# Patient Record
Sex: MALE | Race: ASIAN | NOT HISPANIC OR LATINO
[De-identification: names, ages, dates, MRNs, and addresses within clinical notes are randomized per-mention and may not be internally consistent; named-entity substitution may affect disease eponyms.]

---

## 2022-04-04 PROBLEM — Z00.00 ENCOUNTER FOR PREVENTIVE HEALTH EXAMINATION: Status: ACTIVE | Noted: 2022-04-04

## 2022-04-06 ENCOUNTER — APPOINTMENT (OUTPATIENT)
Dept: THORACIC SURGERY | Facility: CLINIC | Age: 72
End: 2022-04-06
Payer: COMMERCIAL

## 2022-04-06 VITALS — HEIGHT: 65 IN | WEIGHT: 176 LBS | BODY MASS INDEX: 29.32 KG/M2

## 2022-04-06 PROCEDURE — G0296 VISIT TO DETERM LDCT ELIG: CPT

## 2022-04-06 NOTE — PLAN
[Smoking Cessation] : smoking cessation [FreeTextEntry1] : Plan:\par \par -Low dose CT chest for lung cancer screening. Dr. Adán Correia ordered the low dose CT.      \par \par -Follow up with patient and his referring provider after his LDCT results have been reviewed by the multidisciplinary clinical team, if needed.\par \par -Encourage continued smoking abstinence.\par \par -Patient declines referral to CTC and CCX\par \par Should I screen? tool utilized. 6 Year risk of lung cancer is   4%. \par \par Engaged in discussion regarding risks of screening during Covid-19 pandemic and precautions that are being used  to reduce exposure.\par \par Engaged in shared decision making with Mr. LOMAS . Answered all questions. He verbalized understanding and agreement. He knows to call back with and questions or concerns.\par

## 2022-04-06 NOTE — ASSESSMENT
[Maintenance] : Maintenance: The patient has quit for more than 6 months [de-identified] : Daughter and patient report confidence in maintaining smoking abstinence.

## 2022-04-06 NOTE — HISTORY OF PRESENT ILLNESS
[Former] : former smoker [_____ pack-years] : [unfilled] pack-years [TextBox_13] : Referred by Dr. Adán Correia\par \par TONA LOMAS   had telephonic visit for a review of eligibility and discussion of the Low dose CT lung cancer screening program. A telephonic visit occurred due to the patient not having access to a smart phone or a computer for an audio/visual visit.  The following was reviewed and confirmed the patient meets screening eligibility criteria.\par -Age 71 year\par Smoking Status:\par -Former smoker\par -Number of pack(s) per day: 1- 2 PPD\par -Number of years smoked: 49\par -Number of pack years smokin.5\par -Number of years since quitting smokin\par -Quit year: \par \par Mr. LOMAS   denies any signs or symptoms of lung cancer including new cough, changing cough, hemoptysis, and unintentional weight loss. \par \par Mr. LOMAS daughter reports he had "fluid in his lungs" 5 years ago and had chest tube placed. Has had hemoptysis in the past, last episode over 2 years ago. History of COPD. He  denies any personal history of lung cancer. Reports no lung cancer in a 1st degree relative. Reports no lung cancer in a 2nd degree relative. . Denies any  history of  occupational exposures. Has no exposure to 2nd hand smoke.\par  [TextBox_10] : 2017

## 2022-04-06 NOTE — REASON FOR VISIT
[Home] : at home, [unfilled] , at the time of the visit. [Other Location: e.g. Home (Enter Location, City,State)___] : at [unfilled] [Family Member] : family member [] :  [Verbal consent obtained from patient] : the patient, [unfilled] [Interpreters_IDNumber] : [id number] [Initial Evaluation] : an initial evaluation visit [Review of Eligibility] : review of eligibility [Low-Dose CT Screening Discussion] : low-dose CT lung cancer screening discussion

## 2022-04-20 ENCOUNTER — NON-APPOINTMENT (OUTPATIENT)
Age: 72
End: 2022-04-20

## 2022-11-08 ENCOUNTER — APPOINTMENT (OUTPATIENT)
Dept: GASTROENTEROLOGY | Facility: CLINIC | Age: 72
End: 2022-11-08

## 2022-11-08 VITALS
OXYGEN SATURATION: 97 % | DIASTOLIC BLOOD PRESSURE: 68 MMHG | BODY MASS INDEX: 31.82 KG/M2 | HEART RATE: 96 BPM | HEIGHT: 65 IN | SYSTOLIC BLOOD PRESSURE: 118 MMHG | TEMPERATURE: 98.6 F | WEIGHT: 191 LBS

## 2022-11-08 PROCEDURE — 99203 OFFICE O/P NEW LOW 30 MIN: CPT

## 2022-11-08 NOTE — PHYSICAL EXAM
[Normal] : alert, normal voice/communication, healthy appearing, no acute distress [No Respiratory Distress] : no respiratory distress [Abdomen Tenderness] : non-tender [Abdomen Soft] : soft [Oriented To Time, Place, And Person] : oriented to person, place, and time

## 2022-11-08 NOTE — ASSESSMENT
[FreeTextEntry1] : Schedule colonoscopy with Dr. Hartmann\par - Patient becomes short of breath walking short distances and walking up stairs. Daughter corroborates. Will need clearance from cardiologist/pulmonologist prior to procedure. Referrals provided and will schedule after.\par - Explained the risks (including but not limited to cardiopulmonary anesthetic complications, bleeding, perforation, aspiration, missed lesions and misidentified sites of lesions - complications that might necessitate hospitalization or surgery), benefits and alternatives of colonoscopy were reviewed with the patient. Preparation for the procedure was reviewed with the patient. The patient was informed that she/he would be given intravenous anesthesia by an anesthesiologist for the procedure. The patient was informed that a family member or friend must drive the patient following recovery from the procedure. Patient understands and agrees, all questions answered.\par \par

## 2022-11-08 NOTE — HISTORY OF PRESENT ILLNESS
[FreeTextEntry1] : Daughter is translating from [employer].\par \par Mr. TONA LOMAS is a 72 year old male with a PMH of HTN, COPD (not on home O2), CVA (20+ years ago).\par No PSH. \par \par Referred to Dr. Hartmann by Dr. LAKISHA GARZON for positive FIT test (1 month ago). \par \par No prior colonoscopy. \par Denies change in bowel habits. \par Denies constipation, N/V/D, rectal bleeding, abdominal pain, bloating, unintentional weight loss, early satiety, heartburn or reflux.  \par Denies bloody or black stools. \par BM every 1-2 days; soft, brown stools.  \par Denies family history of colon cancer or advanced colorectal polyps. \par Smoking status: Former smoker, quit 6 years ago, smoked 1-2 PPD x 40 years.\par Alcohol intake: Former alcohol abuse, quit 6 years ago. \par Illicit drug use: None. \par \par \par

## 2022-11-14 ENCOUNTER — APPOINTMENT (OUTPATIENT)
Dept: CARDIOLOGY | Facility: CLINIC | Age: 72
End: 2022-11-14

## 2022-11-14 ENCOUNTER — NON-APPOINTMENT (OUTPATIENT)
Age: 72
End: 2022-11-14

## 2022-11-14 VITALS
WEIGHT: 187 LBS | HEIGHT: 65 IN | BODY MASS INDEX: 31.16 KG/M2 | HEART RATE: 99 BPM | OXYGEN SATURATION: 97 % | SYSTOLIC BLOOD PRESSURE: 98 MMHG | DIASTOLIC BLOOD PRESSURE: 61 MMHG

## 2022-11-14 DIAGNOSIS — Z87.891 PERSONAL HISTORY OF NICOTINE DEPENDENCE: ICD-10-CM

## 2022-11-14 DIAGNOSIS — Z82.3 FAMILY HISTORY OF STROKE: ICD-10-CM

## 2022-11-14 DIAGNOSIS — Z86.73 PERSONAL HISTORY OF TRANSIENT ISCHEMIC ATTACK (TIA), AND CEREBRAL INFARCTION W/OUT RESIDUAL DEFICITS: ICD-10-CM

## 2022-11-14 DIAGNOSIS — Z86.11 PERSONAL HISTORY OF TUBERCULOSIS: ICD-10-CM

## 2022-11-14 PROCEDURE — 99204 OFFICE O/P NEW MOD 45 MIN: CPT

## 2022-11-14 PROCEDURE — 93000 ELECTROCARDIOGRAM COMPLETE: CPT

## 2022-11-14 RX ORDER — UMECLIDINIUM 62.5 UG/1
62.5 AEROSOL, POWDER ORAL
Qty: 30 | Refills: 0 | Status: ACTIVE | COMMUNITY
Start: 2022-10-25

## 2022-11-14 RX ORDER — TAMSULOSIN HYDROCHLORIDE 0.4 MG/1
0.4 CAPSULE ORAL
Qty: 30 | Refills: 0 | Status: ACTIVE | COMMUNITY
Start: 2022-10-25

## 2022-11-14 RX ORDER — ALBUTEROL SULFATE 90 UG/1
108 (90 BASE) INHALANT RESPIRATORY (INHALATION)
Qty: 8 | Refills: 0 | Status: ACTIVE | COMMUNITY
Start: 2022-10-17

## 2022-11-14 NOTE — HISTORY OF PRESENT ILLNESS
[FreeTextEntry1] : 71 yo male with COPD, CAD (reported mod-severe coronary calcification of LM and proximal LAD per 4/15/22 screening CT for lung CA on 415/22), who presents today for cardiac evaluation of exertional dyspnea and pre-operative cardiac evaluation for pending colonoscopy (due to reported positive FIT test). He has not have any prior cardiac evaluation. \par \par Primary: [facility] (fax: [phone])

## 2022-11-14 NOTE — ASSESSMENT
[FreeTextEntry1] : 73 yo male with COPD, CAD (reported mod-severe coronary calcification of LM and proximal LAD per 4/15/22 screening CT for lung CA on 415/22), and exertional dyspnea, who is currently pending colonoscopy (due to reported positive FIT test). \par ECG today demonstrated sinus rhythm.\par \par Given exertional dyspnea and reported CAD (per 4/15/22 CT scan), patient will need ischemic evaluation.\par WIll perform treadmill nuclear stress test for ischemic evaluation/risk stratification.\par Will perform echocardiogram to assess LV function and structural heart disease.\par After review of test results, will determine if further cardiac work-up or intervention is clinically indicated and make final pre-op cardiac evaluation.\par Will continue aspirin 81 mg po daily and rosuvasatin 5 mg po daily at this time.\par \par BP is currently controlled.\par Will continue losartan 100 mg po daily and HCTZ 25 mg po daily.

## 2022-11-14 NOTE — REVIEW OF SYSTEMS
[SOB] : shortness of breath [Dyspnea on exertion] : dyspnea during exertion [Wheezing] : wheezing [Negative] : Heme/Lymph [Chest Discomfort] : no chest discomfort [Lower Ext Edema] : no extremity edema [Leg Claudication] : no intermittent leg claudication [Palpitations] : no palpitations [Orthopnea] : no orthopnea [Syncope] : no syncope

## 2022-11-14 NOTE — PHYSICAL EXAM
[Well Developed] : well developed [Well Nourished] : well nourished [No Acute Distress] : no acute distress [Normal Conjunctiva] : normal conjunctiva [Normal Venous Pressure] : normal venous pressure [No Carotid Bruit] : no carotid bruit [Normal S1, S2] : normal S1, S2 [No Murmur] : no murmur [No Rub] : no rub [No Gallop] : no gallop [Wheezing Bilaterally] : wheezing was heard diffusely over both lungs [Normal Gait] : normal gait [No Edema] : no edema [No Cyanosis] : no cyanosis [No Clubbing] : no clubbing [Moves all extremities] : moves all extremities [No Focal Deficits] : no focal deficits [Normal Speech] : normal speech [Alert and Oriented] : alert and oriented [Normal memory] : normal memory

## 2022-11-14 NOTE — CARDIOLOGY SUMMARY
[de-identified] : \par 11/14/22 ECG: Sinus rhythm, rate 91 bpm\par  [de-identified] : \par 4/15/22 CT lung cancer screening: \par Extensive emphysema\par Coronary artery calcifications (mod-severe LM and LAD)\par Mildly dilated ascending thoracic aorta

## 2022-11-15 ENCOUNTER — APPOINTMENT (OUTPATIENT)
Dept: PULMONOLOGY | Facility: CLINIC | Age: 72
End: 2022-11-15

## 2022-11-15 VITALS
HEIGHT: 65 IN | WEIGHT: 187 LBS | TEMPERATURE: 98.6 F | OXYGEN SATURATION: 98 % | HEART RATE: 90 BPM | BODY MASS INDEX: 31.16 KG/M2

## 2022-11-15 VITALS — DIASTOLIC BLOOD PRESSURE: 60 MMHG | SYSTOLIC BLOOD PRESSURE: 100 MMHG

## 2022-11-15 PROCEDURE — 99205 OFFICE O/P NEW HI 60 MIN: CPT

## 2022-11-15 RX ORDER — FLUTICASONE FUROATE, UMECLIDINIUM BROMIDE AND VILANTEROL TRIFENATATE 100; 62.5; 25 UG/1; UG/1; UG/1
100-62.5-25 POWDER RESPIRATORY (INHALATION) DAILY
Qty: 1 | Refills: 3 | Status: ACTIVE | COMMUNITY
Start: 2022-11-15 | End: 1900-01-01

## 2022-11-15 NOTE — HISTORY OF PRESENT ILLNESS
[TextBox_4] : 72 year old male came in for initial evaluation\par Had a LDCT chest seen, my read: b/l upper lobes fibrosis with bronchiectasis. 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 He was diagnosed with COPD 5 years ago. He is using Albuterol and Incruse.\par He is able to walk 1/4 block.\par he coughs every day. Has throat clearing.\par Last hospitalization 5 years ago.\par Last PFT's 5 years \par Had hemoptysis 5 years ago, nothing since then.\par Snores at night. Takes naps every day.\par No observed pauses.\par \par Quit smoking 6 years ago. Has 73 pack year.\par FHX: denies lung problems.\par \par \par \par

## 2022-11-15 NOTE — PHYSICAL EXAM
[Neck Circumference: ___] : neck circumference: [unfilled] [No Acute Distress] : no acute distress [III] : Mallampati Class: III [Normal Rate/Rhythm] : normal rate/rhythm [Normal S1, S2] : normal s1, s2 [No Resp Distress] : no resp distress [Rhonchi] : rhonchi [No Abnormalities] : no abnormalities [Normal Gait] : normal gait [No Cyanosis] : no cyanosis [No Edema] : no edema [No Focal Deficits] : no focal deficits [Oriented x3] : oriented x3 [Normal Affect] : normal affect [TextBox_105] : clubbing

## 2022-11-15 NOTE — REVIEW OF SYSTEMS
[Postnasal Drip] : postnasal drip [Cough] : cough [SOB on Exertion] : sob on exertion [Negative] : Hematologic

## 2022-12-12 ENCOUNTER — APPOINTMENT (OUTPATIENT)
Dept: CARDIOLOGY | Facility: CLINIC | Age: 72
End: 2022-12-12

## 2022-12-12 PROCEDURE — 93306 TTE W/DOPPLER COMPLETE: CPT

## 2022-12-15 ENCOUNTER — NON-APPOINTMENT (OUTPATIENT)
Age: 72
End: 2022-12-15

## 2023-01-17 ENCOUNTER — APPOINTMENT (OUTPATIENT)
Dept: PULMONOLOGY | Facility: CLINIC | Age: 73
End: 2023-01-17

## 2023-03-17 ENCOUNTER — RESULT REVIEW (OUTPATIENT)
Age: 73
End: 2023-03-17

## 2023-03-17 ENCOUNTER — LABORATORY RESULT (OUTPATIENT)
Age: 73
End: 2023-03-17

## 2023-03-17 ENCOUNTER — APPOINTMENT (OUTPATIENT)
Dept: PULMONOLOGY | Facility: CLINIC | Age: 73
End: 2023-03-17
Payer: COMMERCIAL

## 2023-03-17 VITALS
DIASTOLIC BLOOD PRESSURE: 88 MMHG | TEMPERATURE: 97.6 F | BODY MASS INDEX: 30.49 KG/M2 | HEIGHT: 65 IN | SYSTOLIC BLOOD PRESSURE: 132 MMHG | WEIGHT: 183 LBS | OXYGEN SATURATION: 98 % | HEART RATE: 91 BPM

## 2023-03-17 DIAGNOSIS — Z01.811 ENCOUNTER FOR PREPROCEDURAL RESPIRATORY EXAMINATION: ICD-10-CM

## 2023-03-17 DIAGNOSIS — J43.9 EMPHYSEMA, UNSPECIFIED: ICD-10-CM

## 2023-03-17 DIAGNOSIS — J47.9 BRONCHIECTASIS, UNCOMPLICATED: ICD-10-CM

## 2023-03-17 DIAGNOSIS — G47.33 OBSTRUCTIVE SLEEP APNEA (ADULT) (PEDIATRIC): ICD-10-CM

## 2023-03-17 PROCEDURE — 99215 OFFICE O/P EST HI 40 MIN: CPT

## 2023-03-17 RX ORDER — ALBUTEROL SULFATE 2.5 MG/3ML
(2.5 MG/3ML) SOLUTION RESPIRATORY (INHALATION)
Qty: 1 | Refills: 5 | Status: ACTIVE | COMMUNITY
Start: 2023-03-17 | End: 1900-01-01

## 2023-03-17 NOTE — HISTORY OF PRESENT ILLNESS
[TextBox_4] : 72 year old male with COPD, bronchiectasis, DOV came for f/u.\par Has the same dyspnea on exertion.\par He is on Trelegy with no clear improvement. Does not use Albuterol very often.\par No exacerbations since the last visit.\par Has some cough but does not bring up any sputum\par No hemoptysis.\par DEnies fever, chills, wheezing.\par Did not go for PFT's or pulmonary rehab.\par At home he sleeps better that he slept in the sleep center. Sleep schedule the same\par Scheduled for thromboendarterectomy.\par \par \par

## 2023-03-17 NOTE — REASON FOR VISIT
[Follow-Up] : a follow-up visit [Sleep Apnea] : sleep apnea [Bronchiectasis] : bronchiectasis [COPD] : COPD

## 2023-03-17 NOTE — PHYSICAL EXAM
[No Acute Distress] : no acute distress [Normal Appearance] : normal appearance [Normal Rate/Rhythm] : normal rate/rhythm [Normal S1, S2] : normal s1, s2 [No Resp Distress] : no resp distress [No Acc Muscle Use] : no acc muscle use [Rhonchi] : rhonchi [No Abnormalities] : no abnormalities [No Focal Deficits] : no focal deficits [Oriented x3] : oriented x3 [Normal Affect] : normal affect [TextBox_105] : clubbing

## 2023-03-20 ENCOUNTER — RESULT REVIEW (OUTPATIENT)
Age: 73
End: 2023-03-20

## 2023-03-23 ENCOUNTER — APPOINTMENT (OUTPATIENT)
Dept: CARDIOLOGY | Facility: CLINIC | Age: 73
End: 2023-03-23
Payer: COMMERCIAL

## 2023-03-23 VITALS
BODY MASS INDEX: 30.49 KG/M2 | HEIGHT: 65 IN | OXYGEN SATURATION: 97 % | DIASTOLIC BLOOD PRESSURE: 68 MMHG | SYSTOLIC BLOOD PRESSURE: 111 MMHG | WEIGHT: 183 LBS | HEART RATE: 100 BPM

## 2023-03-23 LAB — BACTERIA SPT CULT: ABNORMAL

## 2023-03-23 PROCEDURE — 99215 OFFICE O/P EST HI 40 MIN: CPT

## 2023-03-23 RX ORDER — ASPIRIN 325 MG/1
325 TABLET, COATED ORAL DAILY
Refills: 0 | Status: ACTIVE | COMMUNITY

## 2023-03-23 NOTE — PHYSICAL EXAM
[Well Developed] : well developed [Well Nourished] : well nourished [No Acute Distress] : no acute distress [Normal Conjunctiva] : normal conjunctiva [Normal Venous Pressure] : normal venous pressure [No Carotid Bruit] : no carotid bruit [Normal S1, S2] : normal S1, S2 [No Murmur] : no murmur [No Rub] : no rub [No Gallop] : no gallop [Wheezing Bilaterally] : wheezing was heard diffusely over both lungs [No Edema] : no edema [No Cyanosis] : no cyanosis [No Clubbing] : no clubbing [Alert and Oriented] : alert and oriented [Normal memory] : normal memory [Moves all extremities] : moves all extremities [No Focal Deficits] : no focal deficits

## 2023-03-24 LAB
ALBUMIN SERPL ELPH-MCNC: 4.7 G/DL
ALP BLD-CCNC: 78 U/L
ALT SERPL-CCNC: 21 U/L
ANION GAP SERPL CALC-SCNC: 13 MMOL/L
APTT BLD: 32.9 SEC
AST SERPL-CCNC: 21 U/L
BASOPHILS # BLD AUTO: 0.12 K/UL
BASOPHILS NFR BLD AUTO: 1.9 %
BILIRUB SERPL-MCNC: 0.2 MG/DL
BUN SERPL-MCNC: 26 MG/DL
CALCIUM SERPL-MCNC: 9.9 MG/DL
CHLORIDE SERPL-SCNC: 105 MMOL/L
CHOLEST SERPL-MCNC: 173 MG/DL
CO2 SERPL-SCNC: 24 MMOL/L
CREAT SERPL-MCNC: 1.27 MG/DL
EGFR: 61 ML/MIN/1.73M2
EOSINOPHIL # BLD AUTO: 0.48 K/UL
EOSINOPHIL NFR BLD AUTO: 7.5 %
GLUCOSE SERPL-MCNC: 128 MG/DL
HCT VFR BLD CALC: 44.7 %
HDLC SERPL-MCNC: 45 MG/DL
HGB BLD-MCNC: 13.7 G/DL
IMM GRANULOCYTES NFR BLD AUTO: 0.2 %
INR PPP: 0.97 RATIO
LDLC SERPL CALC-MCNC: 85 MG/DL
LYMPHOCYTES # BLD AUTO: 1.55 K/UL
LYMPHOCYTES NFR BLD AUTO: 24.2 %
MAN DIFF?: NORMAL
MCHC RBC-ENTMCNC: 28.6 PG
MCHC RBC-ENTMCNC: 30.6 GM/DL
MCV RBC AUTO: 93.3 FL
MONOCYTES # BLD AUTO: 0.67 K/UL
MONOCYTES NFR BLD AUTO: 10.5 %
NEUTROPHILS # BLD AUTO: 3.57 K/UL
NEUTROPHILS NFR BLD AUTO: 55.7 %
NONHDLC SERPL-MCNC: 128 MG/DL
PLATELET # BLD AUTO: 293 K/UL
POTASSIUM SERPL-SCNC: 4 MMOL/L
PROT SERPL-MCNC: 7.8 G/DL
PT BLD: 11.3 SEC
RBC # BLD: 4.79 M/UL
RBC # FLD: 14.5 %
SODIUM SERPL-SCNC: 142 MMOL/L
TRIGL SERPL-MCNC: 218 MG/DL
WBC # FLD AUTO: 6.4 K/UL

## 2023-03-30 RX ORDER — CLOPIDOGREL BISULFATE 75 MG/1
75 TABLET, FILM COATED ORAL DAILY
Refills: 0 | Status: ACTIVE | COMMUNITY

## 2023-03-30 NOTE — HISTORY OF PRESENT ILLNESS
[FreeTextEntry1] : 73 yo male with COPD, recent CVA 1/23/23 at [facility] -> tPA and right carotid artery stenting and pending left carotid intervention, CAD (reported mod-severe coronary calcification of LM and proximal LAD per 4/15/22 screening CT for lung CA on 415/22), and exertional dyspnea, who was initially seen in Nov 2022 for evaluation of exertional dyspnea. He underwent treadmill nuclear stress test at [place] on 3/20/23, which reported medium sized infarct in the apical, apical lateral, apical inferior, mid inferior, and mid inferolateral regions with mild apical hypokinesis, LVEF 56%. Patient continues to report exertional dyspnea, which was attributed to his COPD. Patient denies chest pain, palpitations, syncope, edema, melena, hematochezia, or hematemesis. Patient's daughter reports that his HCTZ and losartan were discontinued during [facility] hospitalization due to low BP. \par \par Primary: [facility] (fax: [phone])

## 2023-03-30 NOTE — CARDIOLOGY SUMMARY
[de-identified] : \par 11/14/22 ECG: Sinus rhythm, rate 91 bpm\par  [de-identified] : \par 3/20/23 Treadmill Myoview (at [facility]): No CP or ECG changes at 87% max pred HR, 3.9 METs. Medium sized moderate infarct of apical, apical lateral, apical inferior, mid inferior, and mid inferolateral regions. Mild apical hypokinesis, LVEF 56%.\par  [de-identified] : \par 4/15/22 CT lung cancer screening: \par Extensive emphysema\par Coronary artery calcifications (mod-severe LM and LAD)\par Mildly dilated ascending thoracic aorta

## 2023-03-30 NOTE — ASSESSMENT
[FreeTextEntry1] : 73 yo male with COPD, recent CVA 1/23/23 at [facility] -> tPA and right carotid artery stenting and pending left carotid intervention, CAD (reported mod-severe coronary calcification of LM and proximal LAD per 4/15/22 screening CT for lung CA on 415/22), and exertional dyspnea with abnormal nuclear stress test on 3/20/23.\par \par Given exertional dyspnea, CAD (per 4/15/22 CT scan), and abnormal nuclear stress test on 3/20/23, will refer patient for left cardiac cath at [facility].\par Will continue aspirin 325 mg po daily and rosuvastatin 5 mg po daily at this time.\par Will check labs for cath (CBC, CMP, lipid panel, PT, PTT).\par Pending results of lipid panel, will adjust rosuvastatin dose given known CAD and recent CVA.\par \par BP is currently normotensive.\par Will continue to monitor off his medications (losartan 100 mg po daily and HCTZ 25 mg po daily), which were discontinued during [facility] hospitalization due to low BP.

## 2023-04-07 ENCOUNTER — NON-APPOINTMENT (OUTPATIENT)
Age: 73
End: 2023-04-07

## 2023-04-09 ENCOUNTER — TRANSCRIPTION ENCOUNTER (OUTPATIENT)
Age: 73
End: 2023-04-09

## 2023-04-26 ENCOUNTER — NON-APPOINTMENT (OUTPATIENT)
Age: 73
End: 2023-04-26

## 2023-04-26 ENCOUNTER — APPOINTMENT (OUTPATIENT)
Dept: CARDIOLOGY | Facility: CLINIC | Age: 73
End: 2023-04-26
Payer: COMMERCIAL

## 2023-04-26 VITALS
DIASTOLIC BLOOD PRESSURE: 70 MMHG | WEIGHT: 178 LBS | TEMPERATURE: 98 F | OXYGEN SATURATION: 97 % | HEART RATE: 66 BPM | SYSTOLIC BLOOD PRESSURE: 95 MMHG | RESPIRATION RATE: 16 BRPM | BODY MASS INDEX: 29.62 KG/M2

## 2023-04-26 DIAGNOSIS — Z01.810 ENCOUNTER FOR PREPROCEDURAL CARDIOVASCULAR EXAMINATION: ICD-10-CM

## 2023-04-26 DIAGNOSIS — R94.39 ABNORMAL RESULT OF OTHER CARDIOVASCULAR FUNCTION STUDY: ICD-10-CM

## 2023-04-26 PROCEDURE — 99214 OFFICE O/P EST MOD 30 MIN: CPT

## 2023-04-26 PROCEDURE — 93000 ELECTROCARDIOGRAM COMPLETE: CPT | Mod: NC

## 2023-04-26 NOTE — ASSESSMENT
[FreeTextEntry1] : 73 yo male with COPD, recent CVA 1/23/23 at [facility] -> tPA and right carotid artery stenting and pending left carotid intervention, and CAD s/p PCI of LAD with Onxy [place] stent on 4/7/23 at [facility]. Patient is currently pending carotid artery stenting at [facility] with Dr. Carlo Sales (fax: [phone]) on 5/2/23.\par \par Patient does not have any cardiac contraindications to carotid artery stenting. \par Pre-op labs to be drawn at [facility] tomorrow.\par If labs are within normal limits (to be determined by ordering physician at [facility]), patient may proceed with acceptable cardiac risk and without further cardiac work-up or intervention.\par \par Patient is clinically stable from CAD standpoint.\par Will continue aspirin 325 mg po daily, clopidogrel 75 mg po daily, and rosuvastatin 5 mg po daily at this time.\par Will repeat labs at follow-up in 3 months and adjust rosuvastatin dose as tolerated. Patient reportedly did not tolerate rosuvastatin 40 mg po daily in past.

## 2023-04-26 NOTE — PHYSICAL EXAM
Mom notified of Dr. Pete's advice. Mom stated understanding and denied questions   [Well Developed] : well developed [Well Nourished] : well nourished [No Acute Distress] : no acute distress [Normal Conjunctiva] : normal conjunctiva [Normal Venous Pressure] : normal venous pressure [No Carotid Bruit] : no carotid bruit [Normal S1, S2] : normal S1, S2 [No Murmur] : no murmur [No Rub] : no rub [No Gallop] : no gallop [Wheezing Bilaterally] : wheezing was heard diffusely over both lungs [No Cyanosis] : no cyanosis [No Clubbing] : no clubbing [Moves all extremities] : moves all extremities [No Focal Deficits] : no focal deficits [Alert and Oriented] : alert and oriented [Normal memory] : normal memory [No Pitting Edema] : no pitting edema present [2+] : right 2+

## 2023-04-26 NOTE — CARDIOLOGY SUMMARY
[de-identified] : \par 4/26/23 ECG: Sinus rhythm, rate 83 bpm, non-specific T wave abnormalities\par 11/14/22 ECG: Sinus rhythm, rate 91 bpm\par  [de-identified] : \par 3/20/23 Treadmill Myoview (at [facility]): No CP or ECG changes at 87% max pred HR, 3.9 METs. Medium sized moderate infarct of apical, apical lateral, apical inferior, mid inferior, and mid inferolateral regions. Mild apical hypokinesis, LVEF 56%.\par  [de-identified] : \par 12/12/22 Echo: Normal LV size and systolic/diastolic function, LVEF 58%. Normal RV size and systolic function. Trace MR. Dilated aortic root (4.2 cm) and proximal ascending aorta (3.8 cm).\par  [de-identified] : \par 4/7/23 Cardiac cath (at [facility]): \par 70% mid LAD (iFR 0.74) -> PCI with [place] stent\par Mild atherosclerosis of LCx, OM1, OM2, and RCA\par LVEDP 12 mmHg [de-identified] : \par 4/15/22 CT lung cancer screening: \par Extensive emphysema\par Coronary artery calcifications (mod-severe LM and LAD)\par Mildly dilated ascending thoracic aorta\par

## 2023-04-26 NOTE — HISTORY OF PRESENT ILLNESS
[Preoperative Visit] : for a medical evaluation prior to surgery [Scheduled Procedure ___] : a [unfilled] [Date of Surgery ___] : on [unfilled] [Surgeon Name ___] : surgeon: [unfilled] [Stable] : Stable [FreeTextEntry1] : \par 71 yo male with COPD, recent CVA 1/23/23 at [facility] -> tPA and right carotid artery stenting and pending left carotid intervention, and CAD s/p PCI of LAD with Onxy [place] stent on 4/7/23 at [facility]. Patient presents today for pre-operative cardiac evaluation for pending carotid artery stenting at [facility] with Dr. Carlo Sales (fax: [phone]) on 5/2/23. Patient denies chest pain, dyspnea, palpitations, syncope, edema, melena, hematochezia, or hematemesis. He reports that he will have labs drawn at [facility] tomorrow. \par \par Primary: [facility] (fax: [phone])

## 2023-05-07 LAB — ACID FAST STN SPT: NORMAL

## 2023-06-19 ENCOUNTER — APPOINTMENT (OUTPATIENT)
Dept: PULMONOLOGY | Facility: CLINIC | Age: 73
End: 2023-06-19

## 2023-07-26 ENCOUNTER — NON-APPOINTMENT (OUTPATIENT)
Age: 73
End: 2023-07-26

## 2023-07-26 ENCOUNTER — APPOINTMENT (OUTPATIENT)
Dept: CARDIOLOGY | Facility: CLINIC | Age: 73
End: 2023-07-26
Payer: COMMERCIAL

## 2023-07-26 VITALS
RESPIRATION RATE: 14 BRPM | BODY MASS INDEX: 52.48 KG/M2 | WEIGHT: 315 LBS | SYSTOLIC BLOOD PRESSURE: 134 MMHG | HEIGHT: 65 IN | OXYGEN SATURATION: 97 % | DIASTOLIC BLOOD PRESSURE: 70 MMHG | HEART RATE: 83 BPM

## 2023-07-26 DIAGNOSIS — R06.02 SHORTNESS OF BREATH: ICD-10-CM

## 2023-07-26 DIAGNOSIS — I65.23 OCCLUSION AND STENOSIS OF BILATERAL CAROTID ARTERIES: ICD-10-CM

## 2023-07-26 DIAGNOSIS — I25.118 ATHEROSCLEROTIC HEART DISEASE OF NATIVE CORONARY ARTERY WITH OTHER FORMS OF ANGINA PECTORIS: ICD-10-CM

## 2023-07-26 DIAGNOSIS — E78.5 HYPERLIPIDEMIA, UNSPECIFIED: ICD-10-CM

## 2023-07-26 DIAGNOSIS — I10 ESSENTIAL (PRIMARY) HYPERTENSION: ICD-10-CM

## 2023-07-26 DIAGNOSIS — I63.9 CEREBRAL INFARCTION, UNSPECIFIED: ICD-10-CM

## 2023-07-26 PROCEDURE — 93000 ELECTROCARDIOGRAM COMPLETE: CPT

## 2023-07-26 PROCEDURE — 99214 OFFICE O/P EST MOD 30 MIN: CPT

## 2023-07-26 RX ORDER — ROSUVASTATIN CALCIUM 10 MG/1
10 TABLET, FILM COATED ORAL DAILY
Refills: 0 | Status: ACTIVE | COMMUNITY

## 2023-07-26 NOTE — ASSESSMENT
[FreeTextEntry1] : 74 yo male with COPD, recent CVA 1/23/23 at [facility] -> tPA and right carotid artery stenting and left carotid artery stent May 2023 at [facility] with post-op CVA, and CAD s/p PCI of LAD with Onxy [place] stent on 4/7/23 at [facility]. \par \par ECG today demonstrated sinus rhythm, rate 83 bpm, non-specific T wave abnormalities.\par Patient is clinically stable from CAD standpoint.\par Will continue aspirin 325 mg po daily, clopidogrel 75 mg po daily, and rosuvastatin 10 mg po daily at this time.\par Patient to reduce aspirin from 325 mg po daily to 81 mg po daily after 3 months post-recent CVA as per neurologist.\par Will repeat lipid panel at next follow-up in 6 months.

## 2023-07-26 NOTE — REVIEW OF SYSTEMS
1. No medication changes, will review lab results and determine if any further medication adjustments needed.   2. If kidney function stable on labs, will have you reschedule CT scan  3. Follow up with Xochitl in [facility] in 3 months  4. Please call with any additional questions/concerns [phone]   [Negative] : Heme/Lymph [Weakness] : weakness [Chest Discomfort] : no chest discomfort [Lower Ext Edema] : no extremity edema [Leg Claudication] : no intermittent leg claudication [Palpitations] : no palpitations [Orthopnea] : no orthopnea [Syncope] : no syncope [Coughing Up Blood] : no hemoptysis

## 2023-07-26 NOTE — HISTORY OF PRESENT ILLNESS
[FreeTextEntry1] : 74 yo male with COPD, recent CVA 1/23/23 at [facility] -> tPA and right carotid artery stenting and left carotid artery stent May 2023 at [facility] with post-op CVA, and CAD s/p PCI of LAD with Onxy [place] stent on 4/7/23 at [facility]. Patient presents today for follow-up. Daughter reports that patient has completed PT following his CVA in May 2023 post-carotid artery stent procedure at [facility]. Patient denies chest pain, dyspnea, palpitations, syncope, edema, melena, hematochezia, or hematemesis. He reports residual left sided weakness.

## 2023-07-26 NOTE — CARDIOLOGY SUMMARY
[de-identified] : \par 4/26/23 ECG: Sinus rhythm, rate 83 bpm, non-specific T wave abnormalities\par 11/14/22 ECG: Sinus rhythm, rate 91 bpm\par  [de-identified] : \par 3/20/23 Treadmill Myoview (at [facility]): No CP or ECG changes at 87% max pred HR, 3.9 METs. Medium sized moderate infarct of apical, apical lateral, apical inferior, mid inferior, and mid inferolateral regions. Mild apical hypokinesis, LVEF 56%.\par  [de-identified] : \par 12/12/22 Echo: Normal LV size and systolic/diastolic function, LVEF 58%. Normal RV size and systolic function. Trace MR. Dilated aortic root (4.2 cm) and proximal ascending aorta (3.8 cm).\par  [de-identified] : \par 4/15/22 CT lung cancer screening: \par Extensive emphysema\par Coronary artery calcifications (mod-severe LM and LAD)\par Mildly dilated ascending thoracic aorta\par  [de-identified] : \par 4/7/23 Cardiac cath (at [facility]): \par 70% mid LAD (iFR 0.74) -> PCI with [place] stent\par Mild atherosclerosis of LCx, OM1, OM2, and RCA\par LVEDP 12 mmHg

## 2023-07-26 NOTE — PHYSICAL EXAM
[Well Developed] : well developed [Well Nourished] : well nourished [No Acute Distress] : no acute distress [Normal Conjunctiva] : normal conjunctiva [Normal Venous Pressure] : normal venous pressure [No Carotid Bruit] : no carotid bruit [Normal S1, S2] : normal S1, S2 [No Murmur] : no murmur [No Rub] : no rub [No Gallop] : no gallop [No Pitting Edema] : no pitting edema present [2+] : right 2+ [Wheezing Bilaterally] : wheezing was heard diffusely over both lungs [No Cyanosis] : no cyanosis [No Clubbing] : no clubbing [No Edema] : no edema

## 2024-03-27 ENCOUNTER — APPOINTMENT (OUTPATIENT)
Dept: CARDIOLOGY | Facility: CLINIC | Age: 74
End: 2024-03-27

## 2024-09-12 ENCOUNTER — APPOINTMENT (OUTPATIENT)
Dept: PULMONOLOGY | Facility: CLINIC | Age: 74
End: 2024-09-12
Payer: COMMERCIAL

## 2024-09-12 VITALS
HEIGHT: 65 IN | HEART RATE: 84 BPM | TEMPERATURE: 97 F | OXYGEN SATURATION: 97 % | SYSTOLIC BLOOD PRESSURE: 148 MMHG | WEIGHT: 154.32 LBS | BODY MASS INDEX: 25.71 KG/M2 | DIASTOLIC BLOOD PRESSURE: 62 MMHG

## 2024-09-12 DIAGNOSIS — J43.9 EMPHYSEMA, UNSPECIFIED: ICD-10-CM

## 2024-09-12 DIAGNOSIS — G47.33 OBSTRUCTIVE SLEEP APNEA (ADULT) (PEDIATRIC): ICD-10-CM

## 2024-09-12 DIAGNOSIS — J47.9 BRONCHIECTASIS, UNCOMPLICATED: ICD-10-CM

## 2024-09-12 DIAGNOSIS — Z87.891 PERSONAL HISTORY OF NICOTINE DEPENDENCE: ICD-10-CM

## 2024-09-12 PROCEDURE — G2211 COMPLEX E/M VISIT ADD ON: CPT

## 2024-09-12 PROCEDURE — 99215 OFFICE O/P EST HI 40 MIN: CPT

## 2024-09-12 NOTE — REASON FOR VISIT
[Follow-Up] : a follow-up visit [Sleep Apnea] : sleep apnea [COPD] : COPD [Bronchiectasis] : bronchiectasis

## 2024-09-12 NOTE — PHYSICAL EXAM
[No Acute Distress] : no acute distress [Normal Appearance] : normal appearance [Normal Rate/Rhythm] : normal rate/rhythm [Normal S1, S2] : normal s1, s2 [No Resp Distress] : no resp distress [Rhonchi] : rhonchi [No Abnormalities] : no abnormalities [No Clubbing] : no clubbing [No Focal Deficits] : no focal deficits [Oriented x3] : oriented x3 [Normal Affect] : normal affect

## 2024-09-12 NOTE — HISTORY OF PRESENT ILLNESS
[TextBox_4] : 74 year old male with COPD, apical bronchiectasis, DOV came for f/u Last seen in 2023 He did not have a repeat LDCT chest Last CT reviewed: my read: several small lung nodules, emphysema, bi apical bronchiectasis with fibrosis  He was in [facility] for the past year. he had an exacerbation there because he ran out of Trelegy. Has the same dyspnea on exertion. Has the same cough with white sputum. No hemoptysis He quit smoking 7 years ago he could not tolerate PAP. Last sputum was positive for M [place] He did not have pulmonary rehab yet  lost VibraPEP device

## 2024-09-20 ENCOUNTER — APPOINTMENT (OUTPATIENT)
Dept: PULMONOLOGY | Facility: CLINIC | Age: 74
End: 2024-09-20
Payer: COMMERCIAL

## 2024-09-20 PROCEDURE — 94060 EVALUATION OF WHEEZING: CPT

## 2024-09-20 PROCEDURE — 94727 GAS DIL/WSHOT DETER LNG VOL: CPT

## 2024-09-20 PROCEDURE — 94618 PULMONARY STRESS TESTING: CPT

## 2024-09-20 PROCEDURE — 94729 DIFFUSING CAPACITY: CPT

## 2024-09-22 LAB
ACID FAST STN SPT: NORMAL
BACTERIA SPT CULT: ABNORMAL

## 2024-11-20 ENCOUNTER — APPOINTMENT (OUTPATIENT)
Dept: THORACIC SURGERY | Facility: CLINIC | Age: 74
End: 2024-11-20

## 2024-11-20 ENCOUNTER — NON-APPOINTMENT (OUTPATIENT)
Age: 74
End: 2024-11-20

## 2024-12-12 ENCOUNTER — RESULT REVIEW (OUTPATIENT)
Age: 74
End: 2024-12-12

## 2025-02-03 ENCOUNTER — APPOINTMENT (OUTPATIENT)
Dept: PULMONOLOGY | Facility: CLINIC | Age: 75
End: 2025-02-03
Payer: MEDICARE

## 2025-02-03 VITALS
OXYGEN SATURATION: 97 % | WEIGHT: 154 LBS | DIASTOLIC BLOOD PRESSURE: 52 MMHG | TEMPERATURE: 94 F | HEART RATE: 86 BPM | HEIGHT: 65 IN | BODY MASS INDEX: 25.66 KG/M2 | SYSTOLIC BLOOD PRESSURE: 113 MMHG

## 2025-02-03 DIAGNOSIS — G47.33 OBSTRUCTIVE SLEEP APNEA (ADULT) (PEDIATRIC): ICD-10-CM

## 2025-02-03 DIAGNOSIS — J47.9 BRONCHIECTASIS, UNCOMPLICATED: ICD-10-CM

## 2025-02-03 DIAGNOSIS — J43.9 EMPHYSEMA, UNSPECIFIED: ICD-10-CM

## 2025-02-03 PROCEDURE — 99214 OFFICE O/P EST MOD 30 MIN: CPT

## 2025-02-04 LAB
ANION GAP SERPL CALC-SCNC: 14 MMOL/L
BASOPHILS # BLD AUTO: 0.07 K/UL
BASOPHILS NFR BLD AUTO: 1.3 %
BUN SERPL-MCNC: 22 MG/DL
CALCIUM SERPL-MCNC: 9.4 MG/DL
CHLORIDE SERPL-SCNC: 103 MMOL/L
CO2 SERPL-SCNC: 22 MMOL/L
CREAT SERPL-MCNC: 1.41 MG/DL
EGFR: 53 ML/MIN/1.73M2
EOSINOPHIL # BLD AUTO: 0.42 K/UL
EOSINOPHIL NFR BLD AUTO: 7.5 %
GLUCOSE SERPL-MCNC: 97 MG/DL
HCT VFR BLD CALC: 47.4 %
HGB BLD-MCNC: 15.1 G/DL
IMM GRANULOCYTES NFR BLD AUTO: 0.5 %
LYMPHOCYTES # BLD AUTO: 1.56 K/UL
LYMPHOCYTES NFR BLD AUTO: 28 %
MAN DIFF?: NORMAL
MCHC RBC-ENTMCNC: 28.9 PG
MCHC RBC-ENTMCNC: 31.9 G/DL
MCV RBC AUTO: 90.8 FL
MONOCYTES # BLD AUTO: 0.66 K/UL
MONOCYTES NFR BLD AUTO: 11.8 %
NEUTROPHILS # BLD AUTO: 2.83 K/UL
NEUTROPHILS NFR BLD AUTO: 50.9 %
PLATELET # BLD AUTO: 257 K/UL
POTASSIUM SERPL-SCNC: 4 MMOL/L
RBC # BLD: 5.22 M/UL
RBC # FLD: 13.6 %
SODIUM SERPL-SCNC: 139 MMOL/L
WBC # FLD AUTO: 5.57 K/UL

## 2025-03-10 ENCOUNTER — APPOINTMENT (OUTPATIENT)
Dept: PULMONOLOGY | Facility: CLINIC | Age: 75
End: 2025-03-10
Payer: MEDICARE

## 2025-03-10 VITALS
HEIGHT: 65 IN | OXYGEN SATURATION: 99 % | BODY MASS INDEX: 25.66 KG/M2 | HEART RATE: 81 BPM | WEIGHT: 154 LBS | DIASTOLIC BLOOD PRESSURE: 65 MMHG | SYSTOLIC BLOOD PRESSURE: 154 MMHG | TEMPERATURE: 96 F

## 2025-03-10 DIAGNOSIS — G47.33 OBSTRUCTIVE SLEEP APNEA (ADULT) (PEDIATRIC): ICD-10-CM

## 2025-03-10 DIAGNOSIS — J43.9 EMPHYSEMA, UNSPECIFIED: ICD-10-CM

## 2025-03-10 DIAGNOSIS — J47.9 BRONCHIECTASIS, UNCOMPLICATED: ICD-10-CM

## 2025-03-10 PROCEDURE — 99215 OFFICE O/P EST HI 40 MIN: CPT

## 2025-03-10 PROCEDURE — 99205 OFFICE O/P NEW HI 60 MIN: CPT

## 2025-03-20 ENCOUNTER — RESULT REVIEW (OUTPATIENT)
Age: 75
End: 2025-03-20

## 2025-05-02 ENCOUNTER — APPOINTMENT (OUTPATIENT)
Dept: PULMONOLOGY | Facility: CLINIC | Age: 75
End: 2025-05-02

## 2025-06-10 ENCOUNTER — APPOINTMENT (OUTPATIENT)
Dept: PULMONOLOGY | Facility: CLINIC | Age: 75
End: 2025-06-10

## 2025-08-06 ENCOUNTER — NON-APPOINTMENT (OUTPATIENT)
Age: 75
End: 2025-08-06